# Patient Record
Sex: MALE | Race: WHITE | NOT HISPANIC OR LATINO | Employment: FULL TIME | URBAN - METROPOLITAN AREA
[De-identification: names, ages, dates, MRNs, and addresses within clinical notes are randomized per-mention and may not be internally consistent; named-entity substitution may affect disease eponyms.]

---

## 2019-01-02 ENCOUNTER — OFFICE VISIT (OUTPATIENT)
Dept: URGENT CARE | Facility: CLINIC | Age: 49
End: 2019-01-02
Payer: COMMERCIAL

## 2019-01-02 VITALS
SYSTOLIC BLOOD PRESSURE: 133 MMHG | HEART RATE: 76 BPM | HEIGHT: 72 IN | WEIGHT: 273 LBS | BODY MASS INDEX: 36.98 KG/M2 | DIASTOLIC BLOOD PRESSURE: 87 MMHG | OXYGEN SATURATION: 99 % | RESPIRATION RATE: 16 BRPM | TEMPERATURE: 98 F
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
## 2019-01-02 DIAGNOSIS — M25.511 ACUTE PAIN OF RIGHT SHOULDER: Primary | ICD-10-CM

## 2019-01-02 LAB
CTP QC/QA: YES
POC 10 PANEL DRUG SCREEN: NEGATIVE

## 2019-01-02 PROCEDURE — 99204 PR OFFICE/OUTPT VISIT, NEW, LEVL IV, 45-59 MIN: ICD-10-PCS | Mod: S$GLB,,, | Performed by: INTERNAL MEDICINE

## 2019-01-02 PROCEDURE — 73030 X-RAY EXAM OF SHOULDER: CPT | Mod: RT,S$GLB,, | Performed by: RADIOLOGY

## 2019-01-02 PROCEDURE — 73030 XR SHOULDER TRAUMA 3 VIEW RIGHT: ICD-10-PCS | Mod: RT,S$GLB,, | Performed by: RADIOLOGY

## 2019-01-02 PROCEDURE — 99204 OFFICE O/P NEW MOD 45 MIN: CPT | Mod: S$GLB,,, | Performed by: INTERNAL MEDICINE

## 2019-01-02 PROCEDURE — 80305 DRUG TEST PRSMV DIR OPT OBS: CPT | Mod: QW,S$GLB,, | Performed by: INTERNAL MEDICINE

## 2019-01-02 PROCEDURE — 80305 POCT RAPID DRUG SCREEN 10 PANEL: ICD-10-PCS | Mod: QW,S$GLB,, | Performed by: INTERNAL MEDICINE

## 2019-01-02 RX ORDER — NAPROXEN 500 MG/1
500 TABLET ORAL
Qty: 21 TABLET | Refills: 2 | Status: SHIPPED | OUTPATIENT
Start: 2019-01-02 | End: 2019-01-09

## 2019-01-02 NOTE — LETTER
[facility]  [address] [place] [address], [address]  [facility] [address]  Phone: [phone]  Fax: [phone]  [facility]: [email or web address]    Pt Name: Arsenio Espinoza III  Injury Date: 01/02/2019   Employee ID:  Date of First Treatment: 01/02/2019   Company: [employer].       Appointment Time: 06:15 PM Arrived: 6:30pm   Provider: Sera Hill MD Time Out:7:30pm     Office Treatment:   1. Acute pain of right shoulder    2. Accident at workplace      Medications Ordered This Encounter   Medications    naproxen (NAPROSYN) 500 MG tablet                 Return Appointment: 1-16-19 [facility]  [address] [place] [address]  [place] [address]

## 2019-01-03 ENCOUNTER — OFFICE VISIT (OUTPATIENT)
Dept: URGENT CARE | Facility: CLINIC | Age: 49
End: 2019-01-03
Payer: COMMERCIAL

## 2019-01-03 VITALS
DIASTOLIC BLOOD PRESSURE: 83 MMHG | WEIGHT: 273 LBS | HEIGHT: 73 IN | BODY MASS INDEX: 36.18 KG/M2 | TEMPERATURE: 97 F | SYSTOLIC BLOOD PRESSURE: 123 MMHG | RESPIRATION RATE: 18 BRPM | OXYGEN SATURATION: 98 % | HEART RATE: 66 BPM

## 2019-01-03 DIAGNOSIS — S29.011A STRAIN OF RIGHT PECTORALIS MUSCLE, INITIAL ENCOUNTER: Primary | ICD-10-CM

## 2019-01-03 PROCEDURE — 99204 OFFICE O/P NEW MOD 45 MIN: CPT | Mod: S$GLB,,, | Performed by: FAMILY MEDICINE

## 2019-01-03 PROCEDURE — 99204 PR OFFICE/OUTPT VISIT, NEW, LEVL IV, 45-59 MIN: ICD-10-PCS | Mod: S$GLB,,, | Performed by: FAMILY MEDICINE

## 2019-01-03 NOTE — PATIENT INSTRUCTIONS
Shoulder Sprain  A sprain is a stretching or tearing of the ligaments that hold a joint together. A sprain may take up to 8 weeks to fully heal, depending on how severe it is. Moderate to severe shoulder sprains are treated with a sling or shoulder immobilizer. Minor sprains can be treated without any special support.  Home care  The following guidelines will help you care for your injury at home:  · If a sling was given to you, leave it in place for the time advised by your healthcare provider. If you arent sure how long to wear it, ask for advice. If the sling becomes loose, adjust it so that your forearm is level with the ground. Your shoulder should feel well supported.  · Put an ice pack on the injured area for 20 minutes every 1 to 2 hours the first day. You can make your own ice pack by putting ice cubes in a plastic bag. A bag of frozen peas or something similar works well too. Wrap the bag in a thin towel. Continue with ice packs 3 to 4 times a day for the next 2 to 3 days. Then use the pack as needed to ease pain and swelling.  · You may use acetaminophen or ibuprofen to control pain, unless another pain medicine was prescribed. If you have chronic liver or kidney disease, talk with your healthcare provider before using these medicines. Also talk with your provider if youve had a stomach ulcer or gastrointestinal bleeding.  · Shoulder joints become stiff if left in a sling for too long. You should start range of motion exercises about 7 to 10 days after the injury. Talk with your provider to find out what type of exercises to do and how soon to start.  Follow-up care  Follow up with your healthcare provider, or as advised.  Any X-rays you had today dont show any broken bones, breaks, or fractures. Sometimes fractures dont show up on the first X-ray. Bruises and sprains can sometimes hurt as much as a fracture. These injuries can take time to heal completely. If your symptoms dont improve or they get  worse, talk with your provider. You may need a repeat X-ray or other treatments.  When to seek medical advice  Call your healthcare provider right away if any of these occur:  · Shoulder pain or swelling in your arm that gets worse  · Fingers become cold, blue, numb, or tingly  · Large amount of bruising of the shoulder or upper arm  · Fever or chills  Date Last Reviewed: 8/1/2016  © 8094-5705 [employer]. [address] [place] [address]. All rights reserved. This information is not intended as a substitute for professional medical care. Always follow your healthcare professional's instructions.        Doorway Pectoral Stretch (Flexibility)    1.  an open doorway. Raise each arm up to the side, bent at 90-degree angles with palms forward. Rest your palms on the door frame.  2. Slowly step forward with one foot. Feel the stretch in your shoulders and chest. Stand upright and dont lean forward.  3. Hold for 30 seconds. Step back and relax.  4. Repeat 3 times, or as instructed.  Date Last Reviewed: 3/10/2016  © 1219-2146 [employer]. [address] [place] [address]. All rights reserved. This information is not intended as a substitute for professional medical care. Always follow your healthcare professional's instructions.

## 2019-01-03 NOTE — PROGRESS NOTES
Subjective:       Patient ID: Arsenio Espinoza III is a 48 y.o. male.    Vitals:  height is 6' (1.829 m) and weight is 123.8 kg (273 lb). His temperature is 98.3 °F (36.8 °C). His blood pressure is 133/87 and his pulse is 76. His respiration is 16 and oxygen saturation is 99%.     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 Pt states his arm is 3/10 pain level resting, but 9/10 when he tries to lift it.                Shoulder Pain    The pain is present in the right shoulder. This is a new problem. The current episode started today. The problem has been unchanged. The pain is at a severity of 3/10. Pertinent negatives include no limited range of motion.       Constitution: Negative for fatigue.   HENT: Negative for facial swelling and facial trauma.    Neck: Negative for neck stiffness.   Cardiovascular: Negative for chest trauma.   Eyes: Negative for eye trauma, double vision and blurred vision.   Gastrointestinal: Negative for abdominal trauma, abdominal pain and rectal bleeding.   Genitourinary: Negative for hematuria, genital trauma and pelvic pain.   Musculoskeletal: Positive for pain and trauma. Negative for joint swelling, abnormal ROM of joint and pain with walking.   Skin: Negative for color change, wound, abrasion and laceration.   Neurological: Negative for dizziness, history of vertigo, light-headedness, coordination disturbances, altered mental status and loss of consciousness.   Hematologic/Lymphatic: Negative for history of bleeding disorder.   Psychiatric/Behavioral: Negative for altered mental status.       Objective:      Physical Exam   Constitutional: He is oriented to person, place, and time. He appears well-developed and well-nourished. He is cooperative.  Non-toxic appearance. He does not appear ill. No distress.   HENT:   Head: Normocephalic and atraumatic.   Right Ear: Hearing,  tympanic membrane, external ear and ear canal normal.   Left Ear: Hearing, tympanic membrane, external ear and ear canal normal.   Nose: Nose normal. No mucosal edema, rhinorrhea or nasal deformity. No epistaxis. Right sinus exhibits no maxillary sinus tenderness and no frontal sinus tenderness. Left sinus exhibits no maxillary sinus tenderness and no frontal sinus tenderness.   Mouth/Throat: Uvula is midline, oropharynx is clear and moist and mucous membranes are normal. No trismus in the jaw. Normal dentition. No uvula swelling. No posterior oropharyngeal erythema.   Eyes: Conjunctivae and lids are normal. Right eye exhibits no discharge. Left eye exhibits no discharge. No scleral icterus.   Sclera clear bilat   Neck: Trachea normal, normal range of motion, full passive range of motion without pain and phonation normal. Neck supple.   Cardiovascular: Normal rate, regular rhythm, normal heart sounds, intact distal pulses and normal pulses.   Pulmonary/Chest: Effort normal and breath sounds normal. No respiratory distress.   Abdominal: Soft. Normal appearance and bowel sounds are normal. He exhibits no distension, no pulsatile midline mass and no mass. There is no tenderness.   Musculoskeletal: He exhibits tenderness (right shoulder area and anterior chest.). He exhibits no edema or deformity.        Right shoulder: He exhibits decreased range of motion, tenderness (right anterior), pain and spasm. He exhibits no bony tenderness, no swelling, no effusion, no crepitus, no deformity and no laceration.        Arms:  Neurological: He is alert and oriented to person, place, and time. He exhibits normal muscle tone. Coordination normal.   Skin: Skin is warm, dry and intact. He is not diaphoretic. No pallor.   Psychiatric: He has a normal mood and affect. His speech is normal and behavior is normal. Judgment and thought content normal. Cognition and memory are normal.   Nursing note and vitals reviewed.      Assessment:        1. Accident at workplace    2. Acute pain of right shoulder        Plan:       2  Accident at workplace  -     POCT Rapid Drug Screen 10 Panel  -     XR SHOULDER TRAUMA 3 VIEW RIGHT; Future; Expected date: 01/02/2019  -     Cancel: XR SHOULDER COMPLETE 2 OR MORE VIEWS RIGHT; Future; Expected date: 01/02/2019    Acute pain of right shoulder  -     Cancel: XR SHOULDER COMPLETE 2 OR MORE VIEWS RIGHT; Future; Expected date: 01/02/2019  -     naproxen (NAPROSYN) 500 MG tablet; Take 1 tablet (500 mg total) by mouth after meals as needed.  Dispense: 21 tablet; Refill: 2    If your condition worsens we recommend that you receive another evaluation at the emergency room immediately or contact your primary medical clinics after hours call service to discuss your concerns. You must understand that you've received an Urgent Care treatment only and that you may be released before all of your medical problems are known or treated. You, the patient, will arrange for follow up care as instructed.  Drink plenty of Fluids  Wash hands frequently using mild antibacterial soap lathering for at least 15 seconds then rinse  Get plenty of Rest  Rest, Heat and elevate the affected area at a level above the heart.   Follow up in Occ med clinic tomorrow  If you are not allergic please take Tylenol every 4-6 hours as needed and/or Ibuprofen every 6-8 hours as needed, over the counter for pain or fever.

## 2019-01-03 NOTE — PROGRESS NOTES
"Subjective:       Patient ID: Arsenio Espinoza III is a 48 y.o. male.    Vitals:  height is 6' 1" (1.854 m) and weight is 123.8 kg (273 lb). His oral temperature is 97 °F (36.1 °C). His blood pressure is 123/83 and his pulse is 66. His respiration is 18 and oxygen saturation is 98%.     Chief Complaint: Chest Injury    Pt states seen in [facility] for injury on 1/2/19. States the shoulder is not the issues, his having right chest wall pains      Shoulder Injury    The incident occurred at work. The right shoulder is affected. The incident occurred 12 to 24 hours ago. The injury mechanism was a direct blow. The quality of the pain is described as aching. The pain is mild. Associated symptoms include chest pain. The symptoms are aggravated by movement, overhead lifting and palpation. He has tried nothing for the symptoms. The treatment provided no relief.    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  He had to catch hold of a part of the forklift to keep from falling in the mud and that is how the hyperextension occurred.  He believes he may have bumped his shin but states it is only minimally sore in the left side and he is not having any difficulty walking.  He is continuing to have discomfort and difficulty abducting or externally rotating his shoulder without significant pain. He denies any cystic sting symptoms such is pleuritic chest pain fever chills difficulty breathing or swallowing.    Constitution: Negative for chills, fatigue and fever.   HENT: Negative for congestion and sore throat.    Neck: Negative for painful lymph nodes.   Cardiovascular: Positive for chest pain. Negative for leg swelling.   Eyes: Negative for double vision and blurred vision.   Respiratory: Negative for cough and shortness of breath.    Gastrointestinal: Negative for " nausea, vomiting and diarrhea.   Genitourinary: Negative for dysuria, frequency and urgency.   Musculoskeletal: Negative for joint pain, joint swelling, muscle cramps and muscle ache.   Skin: Negative for color change, pale and rash.   Allergic/Immunologic: Negative for seasonal allergies.   Neurological: Negative for dizziness, history of vertigo, light-headedness, passing out and headaches.   Hematologic/Lymphatic: Negative for swollen lymph nodes, easy bruising/bleeding and history of blood clots. Does not bruise/bleed easily.   Psychiatric/Behavioral: Negative for nervous/anxious, sleep disturbance and depression. The patient is not nervous/anxious.        Objective:      Physical Exam   Constitutional: He is oriented to person, place, and time. He appears well-developed and well-nourished. He is cooperative.  Non-toxic appearance. He does not appear ill. No distress.   HENT:   Head: Normocephalic and atraumatic.   Right Ear: Hearing, tympanic membrane, external ear and ear canal normal.   Left Ear: Hearing, tympanic membrane, external ear and ear canal normal.   Nose: Nose normal. No mucosal edema, rhinorrhea or nasal deformity. No epistaxis. Right sinus exhibits no maxillary sinus tenderness and no frontal sinus tenderness. Left sinus exhibits no maxillary sinus tenderness and no frontal sinus tenderness.   Mouth/Throat: Uvula is midline, oropharynx is clear and moist and mucous membranes are normal. No trismus in the jaw. Normal dentition. No uvula swelling. No posterior oropharyngeal erythema.   Eyes: Conjunctivae and lids are normal. Right eye exhibits no discharge. Left eye exhibits no discharge. No scleral icterus.   Sclera clear bilat   Neck: Trachea normal, normal range of motion, full passive range of motion without pain and phonation normal. Neck supple.   Cardiovascular: Normal rate, regular rhythm, normal heart sounds, intact distal pulses and normal pulses.   Pulmonary/Chest: Effort normal and  breath sounds normal. No respiratory distress.   Abdominal: Soft. Normal appearance and bowel sounds are normal. He exhibits no distension, no pulsatile midline mass and no mass. There is no tenderness.   Musculoskeletal: Normal range of motion. He exhibits no edema or deformity.   Tenderness in the right anterior rotator cuff and along the pectoralis major tendons.  Limited active range of motion of the right shoulder to 25° of abduction 10° of external rotation and about 30° of abduction.  Normal range of motion in a dependent position no distal right elbow hand pain or limited range of motion.  Distal neurovascular intact.   Neurological: He is alert and oriented to person, place, and time. He exhibits normal muscle tone. Coordination normal.   Skin: Skin is warm, dry and intact. He is not diaphoretic. No pallor.   Psychiatric: He has a normal mood and affect. His speech is normal and behavior is normal. Judgment and thought content normal. Cognition and memory are normal.   Nursing note and vitals reviewed.      Assessment:       1. Strain of right pectoralis muscle, initial encounter        Plan:         Strain of right pectoralis muscle, initial encounter      I have reviewed the patient's right shoulder x-rays done yesterday they show no evidence of subluxation dislocation or fracture.  At this point I think the patient can return to sedentary or light duty work without use of the right arm if such is available to him he is to continue the naproxen prescribed yesterday and he I have shown him some range-of-motion exercises to begin using his right shoulder.  He will follow up in 5 days for re-evaluation and we will assess is range of motion at that time.

## 2019-01-03 NOTE — PATIENT INSTRUCTIONS
If your condition worsens we recommend that you receive another evaluation at the emergency room immediately or contact your primary medical clinics after hours call service to discuss your concerns. You must understand that you've received an Urgent Care treatment only and that you may be released before all of your medical problems are known or treated. You, the patient, will arrange for follow up care as instructed.  Drink plenty of Fluids  Wash hands frequently using mild antibacterial soap lathering for at least 15 seconds then rinse  Get plenty of Rest  Rest, Heat and elevate the affected area at a level above the heart.   Follow up in [facility] clinic tomorrow  If you are not allergic please take Tylenol every 4-6 hours as needed and/or Ibuprofen every 6-8 hours as needed, over the counter for pain or fever.      Shoulder Sprain  A sprain is a stretching or tearing of the ligaments that hold a joint together. A sprain may take up to 8 weeks to fully heal, depending on how severe it is. Moderate to severe shoulder sprains are treated with a sling or shoulder immobilizer. Minor sprains can be treated without any special support.  Home care  The following guidelines will help you care for your injury at home:  · If a sling was given to you, leave it in place for the time advised by your healthcare provider. If you arent sure how long to wear it, ask for advice. If the sling becomes loose, adjust it so that your forearm is level with the ground. Your shoulder should feel well supported.  · Put an ice pack on the injured area for 20 minutes every 1 to 2 hours the first day. You can make your own ice pack by putting ice cubes in a plastic bag. A bag of frozen peas or something similar works well too. Wrap the bag in a thin towel. Continue with ice packs 3 to 4 times a day for the next 2 to 3 days. Then use the pack as needed to ease pain and swelling.  · You may use acetaminophen or ibuprofen to control pain, unless  another pain medicine was prescribed. If you have chronic liver or kidney disease, talk with your healthcare provider before using these medicines. Also talk with your provider if youve had a stomach ulcer or gastrointestinal bleeding.  · Shoulder joints become stiff if left in a sling for too long. You should start range of motion exercises about 7 to 10 days after the injury. Talk with your provider to find out what type of exercises to do and how soon to start.  Follow-up care  Follow up with your healthcare provider, or as advised.  Any X-rays you had today dont show any broken bones, breaks, or fractures. Sometimes fractures dont show up on the first X-ray. Bruises and sprains can sometimes hurt as much as a fracture. These injuries can take time to heal completely. If your symptoms dont improve or they get worse, talk with your provider. You may need a repeat X-ray or other treatments.  When to seek medical advice  Call your healthcare provider right away if any of these occur:  · Shoulder pain or swelling in your arm that gets worse  · Fingers become cold, blue, numb, or tingly  · Large amount of bruising of the shoulder or upper arm  · Fever or chills  Date Last Reviewed: 8/1/2016  © 0111-0751 [employer]. [address] [place] [address], [place] [address]. All rights reserved. This information is not intended as a substitute for professional medical care. Always follow your healthcare professional's instructions.

## 2019-01-03 NOTE — LETTER
[facility] [address], [address]  [facility] [address]  Phone: [phone]  Fax: [phone]  [facility]: [id number]    Pt Name: Arsenio Espinoza III  Injury Date: 01/02/2019   Employee ID:  Date of Treatment: 01/03/2019   Company: [employer]      Appointment Time: 08:25 AM Arrived: 8:40 am   Provider: Dr. Homar Arvizu Time Out:9:24 am     Office Treatment:   1. Strain of right pectoralis muscle, initial encounter               Restrictions: No lifting/pushing/pulling more than 10 lbs, Limited use of right hand and arm(Released for light duty with minimal use of right arm.  No overhead work w/ right arm. )     Return Appointment: 1/07/2019

## 2019-01-07 ENCOUNTER — OFFICE VISIT (OUTPATIENT)
Dept: URGENT CARE | Facility: CLINIC | Age: 49
End: 2019-01-07
Payer: COMMERCIAL

## 2019-01-07 VITALS
HEIGHT: 73 IN | RESPIRATION RATE: 15 BRPM | TEMPERATURE: 98 F | BODY MASS INDEX: 36.18 KG/M2 | WEIGHT: 273 LBS | SYSTOLIC BLOOD PRESSURE: 137 MMHG | DIASTOLIC BLOOD PRESSURE: 84 MMHG | HEART RATE: 82 BPM | OXYGEN SATURATION: 98 %

## 2019-01-07 DIAGNOSIS — S29.011D PECTORALIS MUSCLE STRAIN, SUBSEQUENT ENCOUNTER: Primary | ICD-10-CM

## 2019-01-07 PROCEDURE — 99213 OFFICE O/P EST LOW 20 MIN: CPT | Mod: S$GLB,,, | Performed by: FAMILY MEDICINE

## 2019-01-07 PROCEDURE — 99213 PR OFFICE/OUTPT VISIT, EST, LEVL III, 20-29 MIN: ICD-10-PCS | Mod: S$GLB,,, | Performed by: FAMILY MEDICINE

## 2019-01-07 RX ORDER — ARMODAFINIL 200 MG/1
200 TABLET ORAL
COMMUNITY
Start: 2018-05-17 | End: 2019-01-25 | Stop reason: SDUPTHER

## 2019-01-07 NOTE — PROGRESS NOTES
Subjective:       Patient ID: Arsenio Espinoza III is a 48 y.o. male.    Chief Complaint: Follow-up (pt was injured last Thursday at work)    Pt said that his pain is significantly improved. He still has some pain when he raises his right arm high and he has not attempted to do any heavy lifting.      Pain   This is a new problem. The current episode started in the past 7 days. The problem occurs constantly. The problem has been gradually improving. Associated symptoms include myalgias. Pertinent negatives include no abdominal pain, chest pain, neck pain, numbness or weakness. The symptoms are aggravated by exertion. He has tried heat, rest and NSAIDs for the symptoms. The treatment provided moderate relief.    Patient's range of motion is returned normal over the last days.  He still has a twinge of pain at the insertion of his pectoralis tendon on his anterior glenoid region indeed find mild sore areas over the distal muscle itself if he palpates however his range of motion is full and he believes home with care he can return to his full duty.  He is not having any new symptoms and specifically denies any denies any numbness tingling burning or other symptoms suggestive of circulatory or neurologic compromise.  Review of Systems   Constitution: Negative for weakness and malaise/fatigue.   HENT: Negative for nosebleeds.    Cardiovascular: Negative for chest pain and syncope.   Respiratory: Negative for shortness of breath.    Musculoskeletal: Positive for joint pain and myalgias. Negative for back pain and neck pain.   Gastrointestinal: Negative for abdominal pain.   Genitourinary: Negative for hematuria.   Neurological: Negative for dizziness and numbness.       Objective:      Physical Exam   Constitutional: He appears well-developed and well-nourished.   Cardiovascular: Normal rate and regular rhythm.   Pulmonary/Chest: Effort normal and breath sounds normal.   Musculoskeletal:   Full range of motion both  shoulders in all planes.  Mild tenderness along the right pectoralis insertion and distal muscle belly no ecchymoses no swelling no deformity.  Distal neurovascular in  strength are all normal.       Assessment:       1. Pectoralis muscle strain, subsequent encounter        Plan:       Patient may return to full duty he does not need scheduled follow-up appointment.  He understands that if his pain returns with full duty and is not improving over the next 1-2 weeks than he should follow up and we will consider additional management options.  At this time however patient is stable discharge.         Restrictions: Regular Duty(Return to full duty 1/7/2019)  Follow-up if symptoms worsen or fail to improve.

## 2019-01-07 NOTE — LETTER
[facility] [place] [address], [address]  [facility] [address]  Phone: [phone]  Fax: [phone]  [facility]: [email or web address]    Pt Name: Arsenio Espinoza III  Injury Date: 01/02/2019   Employee ID:  Date of Treatment: 01/07/2019   Company: [employer]      Appointment Time: 08:25 AM Arrived: 8:40 AM   Provider: Homar Arvizu MD Time Out: 9:16 AM     Office Treatment:   1. Pectoralis muscle strain, subsequent encounter               Restrictions: Regular Duty(Return to full duty 1/7/2019)     Return Appointment: N/A

## 2020-08-21 PROBLEM — J01.90 ACUTE SINUSITIS, UNSPECIFIED: Status: RESOLVED | Noted: 2020-04-18 | Resolved: 2020-08-21

## 2020-08-21 PROBLEM — F41.9 ANXIETY: Status: ACTIVE | Noted: 2018-08-21

## 2020-08-21 PROBLEM — M25.511 RIGHT SHOULDER PAIN: Status: RESOLVED | Noted: 2019-01-02 | Resolved: 2020-08-21

## 2020-08-21 PROBLEM — M54.9 DORSALGIA, UNSPECIFIED: Status: ACTIVE | Noted: 2020-04-18

## 2020-08-21 PROBLEM — M54.9 DORSALGIA, UNSPECIFIED: Status: RESOLVED | Noted: 2020-04-18 | Resolved: 2020-08-21

## 2020-08-21 PROBLEM — J01.90 ACUTE SINUSITIS, UNSPECIFIED: Status: ACTIVE | Noted: 2020-04-18

## 2021-10-05 PROBLEM — K85.20 ALCOHOL-INDUCED ACUTE PANCREATITIS: Status: ACTIVE | Noted: 2021-10-05

## 2021-10-05 PROBLEM — M54.9 CHRONIC BACK PAIN: Status: ACTIVE | Noted: 2021-10-05

## 2021-10-05 PROBLEM — G89.29 CHRONIC BACK PAIN: Status: ACTIVE | Noted: 2021-10-05

## 2022-08-12 ENCOUNTER — TELEPHONE (OUTPATIENT)
Dept: HEMATOLOGY/ONCOLOGY | Facility: CLINIC | Age: 52
End: 2022-08-12
Payer: COMMERCIAL

## 2022-08-12 DIAGNOSIS — C43.9 SKIN CANCER (MELANOMA): Primary | ICD-10-CM

## 2022-08-12 NOTE — NURSING
Received referral from [facility] for Melanoma of the shoulder. Appointment info and directions to cancer center provided to patient and he verbalized understanding    Oncology Navigation   Intake  Date of Diagnosis: 8/10/2022  Cancer Type: Melanoma  Internal / External Referral: External  Referral Source: Paper referral from Carey Cedeño  Date of Referral: 2022  Initial Nurse Navigator Contact: 2022  Referral to Initial Contact Timeline (days): 1  Date Worked: 2022  First Appointment Available: 2022  Appointment Date: 2022  First Available Date vs. Scheduled Date (days): 0  Reason if booked > 7 days after scheduling: Specific provider / access     Treatment     Surgical Oncologist: Dr. Tobin Camacho (general surgery)  Consult Date: 2022                          Acuity  ECO  Comorbidities in Medical History: 1  Navigation Acuity: 1     Follow Up  No follow-ups on file.

## 2022-08-18 ENCOUNTER — OFFICE VISIT (OUTPATIENT)
Dept: HEMATOLOGY/ONCOLOGY | Facility: CLINIC | Age: 52
End: 2022-08-18
Payer: COMMERCIAL

## 2022-08-18 VITALS
HEART RATE: 66 BPM | TEMPERATURE: 98 F | RESPIRATION RATE: 16 BRPM | DIASTOLIC BLOOD PRESSURE: 68 MMHG | WEIGHT: 267.44 LBS | HEIGHT: 72 IN | OXYGEN SATURATION: 97 % | BODY MASS INDEX: 36.22 KG/M2 | SYSTOLIC BLOOD PRESSURE: 140 MMHG

## 2022-08-18 DIAGNOSIS — C43.59 MALIGNANT MELANOMA OF UPPER BACK: ICD-10-CM

## 2022-08-18 PROCEDURE — 3078F PR MOST RECENT DIASTOLIC BLOOD PRESSURE < 80 MM HG: ICD-10-PCS | Mod: CPTII,S$GLB,, | Performed by: SURGERY

## 2022-08-18 PROCEDURE — 1160F PR REVIEW ALL MEDS BY PRESCRIBER/CLIN PHARMACIST DOCUMENTED: ICD-10-PCS | Mod: CPTII,S$GLB,, | Performed by: SURGERY

## 2022-08-18 PROCEDURE — 1159F PR MEDICATION LIST DOCUMENTED IN MEDICAL RECORD: ICD-10-PCS | Mod: CPTII,S$GLB,, | Performed by: SURGERY

## 2022-08-18 PROCEDURE — 3077F SYST BP >= 140 MM HG: CPT | Mod: CPTII,S$GLB,, | Performed by: SURGERY

## 2022-08-18 PROCEDURE — 99999 PR PBB SHADOW E&M-EST. PATIENT-LVL V: CPT | Mod: PBBFAC,,, | Performed by: SURGERY

## 2022-08-18 PROCEDURE — 99203 PR OFFICE/OUTPT VISIT, NEW, LEVL III, 30-44 MIN: ICD-10-PCS | Mod: S$GLB,,, | Performed by: SURGERY

## 2022-08-18 PROCEDURE — 99999 PR PBB SHADOW E&M-EST. PATIENT-LVL V: ICD-10-PCS | Mod: PBBFAC,,, | Performed by: SURGERY

## 2022-08-18 PROCEDURE — 3078F DIAST BP <80 MM HG: CPT | Mod: CPTII,S$GLB,, | Performed by: SURGERY

## 2022-08-18 PROCEDURE — 3008F PR BODY MASS INDEX (BMI) DOCUMENTED: ICD-10-PCS | Mod: CPTII,S$GLB,, | Performed by: SURGERY

## 2022-08-18 PROCEDURE — 3077F PR MOST RECENT SYSTOLIC BLOOD PRESSURE >= 140 MM HG: ICD-10-PCS | Mod: CPTII,S$GLB,, | Performed by: SURGERY

## 2022-08-18 PROCEDURE — 1159F MED LIST DOCD IN RCRD: CPT | Mod: CPTII,S$GLB,, | Performed by: SURGERY

## 2022-08-18 PROCEDURE — 99203 OFFICE O/P NEW LOW 30 MIN: CPT | Mod: S$GLB,,, | Performed by: SURGERY

## 2022-08-18 PROCEDURE — 3008F BODY MASS INDEX DOCD: CPT | Mod: CPTII,S$GLB,, | Performed by: SURGERY

## 2022-08-18 PROCEDURE — 1160F RVW MEDS BY RX/DR IN RCRD: CPT | Mod: CPTII,S$GLB,, | Performed by: SURGERY

## 2022-08-18 RX ORDER — SODIUM CHLORIDE, SODIUM LACTATE, POTASSIUM CHLORIDE, CALCIUM CHLORIDE 600; 310; 30; 20 MG/100ML; MG/100ML; MG/100ML; MG/100ML
INJECTION, SOLUTION INTRAVENOUS CONTINUOUS
Status: CANCELLED | OUTPATIENT
Start: 2022-08-18

## 2022-08-18 RX ORDER — CLINDAMYCIN PHOSPHATE 900 MG/50ML
900 INJECTION, SOLUTION INTRAVENOUS
Status: CANCELLED | OUTPATIENT
Start: 2022-08-18

## 2022-08-18 RX ORDER — LIDOCAINE HYDROCHLORIDE 10 MG/ML
1 INJECTION, SOLUTION EPIDURAL; INFILTRATION; INTRACAUDAL; PERINEURAL ONCE
Status: CANCELLED | OUTPATIENT
Start: 2022-08-18 | End: 2022-08-18

## 2022-08-18 NOTE — PROGRESS NOTES
SUBJECTIVE:     History of Present Illness:  Very pleasant 51-year-old male referred by Dr. Cedeño for newly diagnosed right upper back malignant melanoma.  Patient reports pigmented lesion had been located in this area for several years.  His wife subsequently noticed that the lesion had increased in size and was also darkening.  Patient denies bleeding or any significant discomfort in this area.  Patient underwent full body skin examination with subsequent punch biopsy performed on August 4th.  Pathology consistent with 0.5 mm superficial spreading malignant melanoma.  No evidence of ulceration.  No dermal mitoses noted.  No lymphovascular invasion.      Patient denies personal or family history of skin malignancy.  He does report excessive sun exposure throughout his youth.    Chief Complaint   Patient presents with    Melanoma     Melanoma right shoulder/Carey Cedeño (dermatologist)       Review of patient's allergies indicates:   Allergen Reactions    Penicillins Nausea And Vomiting     Other reaction(s): vomiting as child       Current Outpatient Medications   Medication Sig Dispense Refill    cholecalciferol, vitamin D3, (VITAMIN D3) 50 mcg (2,000 unit) Tab Take 1 tablet (2,000 Units total) by mouth once daily.      DULoxetine (CYMBALTA) 30 MG capsule TAKE 1 CAPSULE (30 MG TOTAL) BY MOUTH ONCE DAILY. 30 capsule 11    fluconazole (DIFLUCAN) 200 MG Tab Take 200 mg by mouth every 7 days.      fluticasone propionate (FLONASE) 50 mcg/actuation nasal spray 1 spray (50 mcg total) by Each Nostril route once daily. 16 g 1    levothyroxine (SYNTHROID) 88 MCG tablet Take 1 tablet (88 mcg total) by mouth before breakfast. 90 tablet 1    podofilox (CONDYLOX) 0.5 % external solution Apply topically.      armodafiniL 200 mg Tab TAKE 1 TABLET BY MOUTH ONCE DAILY. (Patient not taking: Reported on 8/18/2022) 30 tablet 0    CIALIS 5 mg tablet Take 1 tablet (5 mg total) by mouth daily as needed. (Patient not taking:  Reported on 8/18/2022) 90 tablet 3    levocetirizine (XYZAL) 5 MG tablet Take 1 tablet (5 mg total) by mouth every evening. (Patient not taking: Reported on 8/18/2022) 30 tablet 1    vardenafiL (LEVITRA) 20 MG tablet Take 20 mg by mouth daily as needed.       No current facility-administered medications for this visit.       Past Medical History:   Diagnosis Date    Ankle sprain     Anxiety disorder     Arthralgia     Back pain     Circadian rhythm sleep disorder, shift work type     History of chicken pox     History of exposure to hazardous bodily fluids     Hypothyroid     4/06/21 RXd Levothyroxine 88 mcg daily; ordered follow up TSH/T4/T3 for 8/2021    Onychomycosis     Periorbital cellulitis     Sleep apnea     Stye      History reviewed. No pertinent surgical history.  Family History   Problem Relation Age of Onset    Diabetes Maternal Grandfather     Heart disease Maternal Grandfather     Heart attack Maternal Grandfather     Emphysema Mother     Asthma Mother     Heart attack Maternal Grandmother      Social History     Tobacco Use    Smoking status: Never Smoker    Smokeless tobacco: Never Used   Substance Use Topics    Alcohol use: Yes     Comment: occasionally    Drug use: No        Review of Systems:  Review of Systems   Constitutional: Negative for appetite change, chills and fever.   Respiratory: Negative for cough, chest tightness and shortness of breath.    Cardiovascular: Negative for chest pain and palpitations.   Gastrointestinal: Negative for abdominal pain.   Hematological: Negative for adenopathy. Does not bruise/bleed easily.       OBJECTIVE:     Vital Signs (Most Recent)  Temp: 97.7 °F (36.5 °C) (08/18/22 1306)  Pulse: 66 (08/18/22 1306)  Resp: 16 (08/18/22 1306)  BP: (!) 140/68 (08/18/22 1312)  SpO2: 97 % (08/18/22 1306)  6' (1.829 m)  121.3 kg (267 lb 6.7 oz)     Physical Exam:  Physical Exam  Constitutional:       General: He is not in acute distress.     Comments:  Fair complexion     Cardiovascular:      Rate and Rhythm: Normal rate.      Pulses: Normal pulses.   Pulmonary:      Effort: Pulmonary effort is normal. No respiratory distress.      Breath sounds: No wheezing.   Musculoskeletal:      Cervical back: Normal range of motion and neck supple.   Skin:     Comments: See photo   Neurological:      Mental Status: He is alert.               Diagnostic Results:  none    Assessment:       1. Malignant melanoma of upper back        Plan:       Thorough conversation with the patient today regarding new diagnosis of malignant melanoma.  Surgical recommendations for wide local excision based on NCCN guidelines have been reviewed at length.  Rationale for 1 cm margins with anticipated primary closure reviewed at length.  Anticipated recovery process also discussed.  Risks, benefits, alternatives for surgery reviewed at length.  Patient has verbalized his understanding wishes to proceed.

## 2022-08-26 ENCOUNTER — TELEPHONE (OUTPATIENT)
Dept: HEMATOLOGY/ONCOLOGY | Facility: CLINIC | Age: 52
End: 2022-08-26
Payer: COMMERCIAL

## 2022-08-26 NOTE — NURSING
Referral received for patient to see Dr. Carrasquillo. Appt info and directions to cancer center provided and patient verbalized understanding

## 2022-08-29 DIAGNOSIS — C00.9 LIP CANCER: Primary | ICD-10-CM

## 2022-08-31 ENCOUNTER — TELEPHONE (OUTPATIENT)
Dept: HEMATOLOGY/ONCOLOGY | Facility: CLINIC | Age: 52
End: 2022-08-31
Payer: COMMERCIAL

## 2022-09-01 DIAGNOSIS — C00.9 LIP CANCER: Primary | ICD-10-CM

## 2022-09-01 PROBLEM — C43.59 MALIGNANT MELANOMA OF UPPER BACK: Status: ACTIVE | Noted: 2022-09-01

## 2022-09-07 ENCOUNTER — PATIENT MESSAGE (OUTPATIENT)
Dept: HEMATOLOGY/ONCOLOGY | Facility: CLINIC | Age: 52
End: 2022-09-07
Payer: COMMERCIAL

## 2022-09-08 ENCOUNTER — TELEPHONE (OUTPATIENT)
Dept: HEMATOLOGY/ONCOLOGY | Facility: CLINIC | Age: 52
End: 2022-09-08
Payer: COMMERCIAL

## 2022-09-08 ENCOUNTER — PATIENT MESSAGE (OUTPATIENT)
Dept: HEMATOLOGY/ONCOLOGY | Facility: CLINIC | Age: 52
End: 2022-09-08
Payer: COMMERCIAL

## 2022-09-08 NOTE — NURSING
Rescheduled patient's appointment to see Dr. Carrasquillo and Dr. Camacho on 9/15/22. Left patient a msg on VM and also sent portal msg to inform. Awaiting on return call to navigation office

## 2022-09-15 ENCOUNTER — OFFICE VISIT (OUTPATIENT)
Dept: HEMATOLOGY/ONCOLOGY | Facility: CLINIC | Age: 52
End: 2022-09-15
Payer: COMMERCIAL

## 2022-09-15 ENCOUNTER — TELEPHONE (OUTPATIENT)
Dept: HEMATOLOGY/ONCOLOGY | Facility: CLINIC | Age: 52
End: 2022-09-15
Payer: COMMERCIAL

## 2022-09-15 ENCOUNTER — TELEPHONE (OUTPATIENT)
Dept: DERMATOLOGY | Facility: CLINIC | Age: 52
End: 2022-09-15
Payer: COMMERCIAL

## 2022-09-15 VITALS
TEMPERATURE: 98 F | HEART RATE: 96 BPM | OXYGEN SATURATION: 97 % | WEIGHT: 272.94 LBS | RESPIRATION RATE: 18 BRPM | DIASTOLIC BLOOD PRESSURE: 89 MMHG | BODY MASS INDEX: 36.97 KG/M2 | SYSTOLIC BLOOD PRESSURE: 129 MMHG | HEIGHT: 72 IN

## 2022-09-15 VITALS
HEIGHT: 72 IN | OXYGEN SATURATION: 97 % | TEMPERATURE: 98 F | DIASTOLIC BLOOD PRESSURE: 89 MMHG | BODY MASS INDEX: 36.97 KG/M2 | HEART RATE: 96 BPM | RESPIRATION RATE: 18 BRPM | WEIGHT: 272.94 LBS | SYSTOLIC BLOOD PRESSURE: 129 MMHG

## 2022-09-15 DIAGNOSIS — C43.59 MALIGNANT MELANOMA OF UPPER BACK: Primary | ICD-10-CM

## 2022-09-15 DIAGNOSIS — C00.9 LIP CANCER: ICD-10-CM

## 2022-09-15 DIAGNOSIS — D00.01: Primary | ICD-10-CM

## 2022-09-15 PROCEDURE — 3074F PR MOST RECENT SYSTOLIC BLOOD PRESSURE < 130 MM HG: ICD-10-PCS | Mod: CPTII,S$GLB,, | Performed by: SURGERY

## 2022-09-15 PROCEDURE — 1159F MED LIST DOCD IN RCRD: CPT | Mod: CPTII,S$GLB,, | Performed by: SURGERY

## 2022-09-15 PROCEDURE — 99213 OFFICE O/P EST LOW 20 MIN: CPT | Mod: S$GLB,,, | Performed by: SURGERY

## 2022-09-15 PROCEDURE — 99203 PR OFFICE/OUTPT VISIT, NEW, LEVL III, 30-44 MIN: ICD-10-PCS | Mod: S$GLB,,, | Performed by: OTOLARYNGOLOGY

## 2022-09-15 PROCEDURE — 3044F PR MOST RECENT HEMOGLOBIN A1C LEVEL <7.0%: ICD-10-PCS | Mod: CPTII,S$GLB,, | Performed by: SURGERY

## 2022-09-15 PROCEDURE — 3074F SYST BP LT 130 MM HG: CPT | Mod: CPTII,S$GLB,, | Performed by: SURGERY

## 2022-09-15 PROCEDURE — 1160F PR REVIEW ALL MEDS BY PRESCRIBER/CLIN PHARMACIST DOCUMENTED: ICD-10-PCS | Mod: CPTII,S$GLB,, | Performed by: SURGERY

## 2022-09-15 PROCEDURE — 99203 OFFICE O/P NEW LOW 30 MIN: CPT | Mod: S$GLB,,, | Performed by: OTOLARYNGOLOGY

## 2022-09-15 PROCEDURE — 3079F DIAST BP 80-89 MM HG: CPT | Mod: CPTII,S$GLB,, | Performed by: SURGERY

## 2022-09-15 PROCEDURE — 3008F PR BODY MASS INDEX (BMI) DOCUMENTED: ICD-10-PCS | Mod: CPTII,S$GLB,, | Performed by: SURGERY

## 2022-09-15 PROCEDURE — 3074F PR MOST RECENT SYSTOLIC BLOOD PRESSURE < 130 MM HG: ICD-10-PCS | Mod: CPTII,S$GLB,, | Performed by: OTOLARYNGOLOGY

## 2022-09-15 PROCEDURE — 3079F PR MOST RECENT DIASTOLIC BLOOD PRESSURE 80-89 MM HG: ICD-10-PCS | Mod: CPTII,S$GLB,, | Performed by: OTOLARYNGOLOGY

## 2022-09-15 PROCEDURE — 3079F PR MOST RECENT DIASTOLIC BLOOD PRESSURE 80-89 MM HG: ICD-10-PCS | Mod: CPTII,S$GLB,, | Performed by: SURGERY

## 2022-09-15 PROCEDURE — 3074F SYST BP LT 130 MM HG: CPT | Mod: CPTII,S$GLB,, | Performed by: OTOLARYNGOLOGY

## 2022-09-15 PROCEDURE — 3044F HG A1C LEVEL LT 7.0%: CPT | Mod: CPTII,S$GLB,, | Performed by: SURGERY

## 2022-09-15 PROCEDURE — 3008F BODY MASS INDEX DOCD: CPT | Mod: CPTII,S$GLB,, | Performed by: SURGERY

## 2022-09-15 PROCEDURE — 3008F PR BODY MASS INDEX (BMI) DOCUMENTED: ICD-10-PCS | Mod: CPTII,S$GLB,, | Performed by: OTOLARYNGOLOGY

## 2022-09-15 PROCEDURE — 1159F PR MEDICATION LIST DOCUMENTED IN MEDICAL RECORD: ICD-10-PCS | Mod: CPTII,S$GLB,, | Performed by: SURGERY

## 2022-09-15 PROCEDURE — 99999 PR PBB SHADOW E&M-EST. PATIENT-LVL III: CPT | Mod: PBBFAC,,, | Performed by: SURGERY

## 2022-09-15 PROCEDURE — 3008F BODY MASS INDEX DOCD: CPT | Mod: CPTII,S$GLB,, | Performed by: OTOLARYNGOLOGY

## 2022-09-15 PROCEDURE — 99999 PR PBB SHADOW E&M-EST. PATIENT-LVL V: ICD-10-PCS | Mod: PBBFAC,,, | Performed by: OTOLARYNGOLOGY

## 2022-09-15 PROCEDURE — 99999 PR PBB SHADOW E&M-EST. PATIENT-LVL V: CPT | Mod: PBBFAC,,, | Performed by: OTOLARYNGOLOGY

## 2022-09-15 PROCEDURE — 3044F PR MOST RECENT HEMOGLOBIN A1C LEVEL <7.0%: ICD-10-PCS | Mod: CPTII,S$GLB,, | Performed by: OTOLARYNGOLOGY

## 2022-09-15 PROCEDURE — 99999 PR PBB SHADOW E&M-EST. PATIENT-LVL III: ICD-10-PCS | Mod: PBBFAC,,, | Performed by: SURGERY

## 2022-09-15 PROCEDURE — 1160F RVW MEDS BY RX/DR IN RCRD: CPT | Mod: CPTII,S$GLB,, | Performed by: SURGERY

## 2022-09-15 PROCEDURE — 99213 PR OFFICE/OUTPT VISIT, EST, LEVL III, 20-29 MIN: ICD-10-PCS | Mod: S$GLB,,, | Performed by: SURGERY

## 2022-09-15 PROCEDURE — 3079F DIAST BP 80-89 MM HG: CPT | Mod: CPTII,S$GLB,, | Performed by: OTOLARYNGOLOGY

## 2022-09-15 PROCEDURE — 3044F HG A1C LEVEL LT 7.0%: CPT | Mod: CPTII,S$GLB,, | Performed by: OTOLARYNGOLOGY

## 2022-09-15 NOTE — PROGRESS NOTES
SUBJECTIVE:     History of Present Illness:  Very pleasant 51-year-old male referred by Dr. Cedeño for newly diagnosed right upper back malignant melanoma.  Patient reports pigmented lesion had been located in this area for several years.  His wife subsequently noticed that the lesion had increased in size and was also darkening.  Patient denies bleeding or any significant discomfort in this area.  Patient underwent full body skin examination with subsequent punch biopsy performed on August 4th.  Pathology consistent with 0.5 mm superficial spreading malignant melanoma.  No evidence of ulceration.  No dermal mitoses noted.  No lymphovascular invasion.      Patient denies personal or family history of skin malignancy.  He does report excessive sun exposure throughout his youth.    S/p WLE 9/1/2022  Pathology: SKIN AND SUBCUTANEOUS TISSUE, RIGHT UPPER BACK, WIDE EXCISION BIOPSY    SITE:   - RESIDUAL IN SITU MELANOMA IN ASSOCIATION WITH DERMAL NEVUS AT BIOPSY    SITE.   - MARGINS NEGATIVE FOR TUMOR.    9/15/2022  Patient doing well following WLE.  Patient denies any pain.  No drainage.  No numbness or paresthesias    No chief complaint on file.      Review of patient's allergies indicates:   Allergen Reactions    Penicillins Nausea And Vomiting     Other reaction(s): vomiting as child       Current Outpatient Medications   Medication Sig Dispense Refill    armodafiniL 200 mg Tab Take 1 tablet by mouth once daily. 30 tablet 1    cholecalciferol, vitamin D3, (VITAMIN D3) 50 mcg (2,000 unit) Tab Take 1 tablet (2,000 Units total) by mouth once daily.      DULoxetine (CYMBALTA) 30 MG capsule TAKE 1 CAPSULE (30 MG TOTAL) BY MOUTH ONCE DAILY. 30 capsule 11    fluconazole (DIFLUCAN) 200 MG Tab Take 200 mg by mouth every Thursday.      levothyroxine (SYNTHROID) 88 MCG tablet Take 1 tablet (88 mcg total) by mouth before breakfast. 90 tablet 1    ondansetron (ZOFRAN-ODT) 8 MG TbDL Take 1 tablet (8 mg total) by mouth every 6 (six)  hours as needed (nausea). 10 tablet 0    podofilox (CONDYLOX) 0.5 % external solution Apply topically 2 (two) times daily as needed.      CIALIS 5 mg tablet Take 1 tablet (5 mg total) by mouth daily as needed. 90 tablet 3    HYDROcodone-acetaminophen (NORCO) 5-325 mg per tablet Take 1 tablet by mouth every 6 (six) hours as needed for Pain. (Patient not taking: Reported on 9/15/2022) 20 tablet 0     No current facility-administered medications for this visit.       Past Medical History:   Diagnosis Date    Ankle sprain     Anxiety disorder     Arthralgia     Back pain     Circadian rhythm sleep disorder, shift work type     History of chicken pox     History of exposure to hazardous bodily fluids     Hypothyroid     4/06/21 RXd Levothyroxine 88 mcg daily; ordered follow up TSH/T4/T3 for 8/2021    Onychomycosis     Periorbital cellulitis     Sleep apnea     uses C-PAP    Stye      Past Surgical History:   Procedure Laterality Date    EXCISION OF MELANOMA Right 9/1/2022    Procedure: EXCISION, MELANOMA -  upper back - Wide Excision;  Surgeon: Tobin Camacho MD;  Location: [facility];  Service: General;  Laterality: Right;     Family History   Problem Relation Age of Onset    Diabetes Maternal Grandfather     Heart disease Maternal Grandfather     Heart attack Maternal Grandfather     Emphysema Mother     Asthma Mother     Heart attack Maternal Grandmother      Social History     Tobacco Use    Smoking status: Never    Smokeless tobacco: Never   Substance Use Topics    Alcohol use: Yes     Comment: occasionally    Drug use: No        Review of Systems:  Review of Systems   Constitutional:  Negative for appetite change, chills and fever.   Respiratory:  Negative for cough, chest tightness and shortness of breath.    Cardiovascular:  Negative for chest pain and palpitations.   Gastrointestinal:  Negative for abdominal pain.   Hematological:  Negative for adenopathy. Does not bruise/bleed easily.     OBJECTIVE:     Vital  Signs (Most Recent)  Temp: 97.7 °F (36.5 °C) (09/15/22 1410)  Pulse: 96 (09/15/22 1410)  Resp: 18 (09/15/22 1410)  BP: 129/89 (09/15/22 1410)  SpO2: 97 % (09/15/22 1410)  6' (1.829 m)  123.8 kg (272 lb 14.9 oz)     Physical Exam:  Physical Exam  Constitutional:       General: He is not in acute distress.     Comments: Fair complexion     Cardiovascular:      Rate and Rhythm: Normal rate.      Pulses: Normal pulses.   Pulmonary:      Effort: Pulmonary effort is normal. No respiratory distress.      Breath sounds: No wheezing.   Musculoskeletal:      Cervical back: Normal range of motion and neck supple.   Skin:         Neurological:      Mental Status: He is alert.           Diagnostic Results:  none    Assessment:       No diagnosis found.      Plan:       Patient doing well following wide local excision.  Pathology report favorable with evidence of residual melanoma in Situ without any invasive component.  Margins widely clear.  Nylon sutures removed and Steri-Strips applied.  Wound well healed.  Continue to avoid strenuous activity for an additional 1-2 weeks.  Furthermore have discussed the ongoing importance of lifelong dermatology call follow-up.  Patient reports he is scheduled for Q 3 follow-up moving forward with his dermatology team.  He will follow up with me p.r.n.

## 2022-09-15 NOTE — PROGRESS NOTES
Date of Encounter: 9/15/2022  Provider: Christel Carrasquillo MD  Referring MD: MD Davidson  PCP: Arsenio Alarcon II MD  Derm: MD Davidson    CC:  CIS right lip    HPI:      Patient is a 51-year-old male who was referred for evaluation treatment of carcinoma in Situ involving his right lower lip by Dr. Cedeño    Patient and his wife noted a lesion involving his right lower lip that would not heal X 3 months. Lesion was biopsied by Dr Cedeño and since that time, the leison has not retured.  His wife also noted a pigmented lesion on his back.  He is S/P WLE MIS with clear margins by Dr Tobin Zambrano.     Hx of sun exposure during childhood but not as an adult. He has tamara family hx  melanoma    ROS:  Constitutional: Negative for activity change and appetite change, weight loss.   Eyes: Negative for discharge, visual changes.   Respiratory: Negative for difficulty breathing and wheezing   Cardiovascular: Negative for chest pain.   Gastrointestinal: Negative for abdominal distention and abdominal pain.   Endocrine: Negative for cold intolerance and heat intolerance.   Genitourinary: Negative for dysuria.   Musculoskeletal: Negative for gait problem, muscle pain and joint swelling.   Skin: Negative for color change and pallor; negative for skin lesions.   Neurological: Negative for syncope and weakness; no numbness face.   Psychiatric/Behavioral: Negative for agitation and confusion; negative for depression.    Physical Exam:      Constitutional  General Appearance: well nourished, well-developed, alert, oriented, in no acute distress  Communication: ability, understanding, normal  Head and Face  Inspection: normocephalic, atraumatic, no scars, lesions or masses   Palpation: no stepoffs, sinus tenderness or masses  Parotid glands: no masses, stones, swelling or tenderness  Eyes  Ocular Motility / Alignment: normal alignment, motility, no proptosis, enophthalmus or nystagmus  Conjunctiva: not injected  Eyelids: no hooding, lag, entropion, or  ectropion  Ears  Hearing: speech reception thresholds grossly normal  External Ears: no auricle lesions, non-tender, mobile to palpation  Otoscopy:  Right Ear: no tympanic membrane lesions, perforations, or effusion, normal EAC  Left Ear: no tympanic membrane lesions, perforations, or effusion, normal EAC  Oral Cavity / Oropharynx  Lips: upper pink and moist; very subtle change in vermillion right lower lip (biopsy site)--patient pointed to it and photos are in Epic; no induration palpated  Teeth: good dentition  Gingiva: healthy  Oral Mucosa: moist, no mucosal lesions  Floor of Mouth: normal, no lesions, salivary ducts patent  Tongue: moist, normal mobility, no lesions  Palate: soft and hard palates without lesions or ulcers  Neck  Inspection and Palpation: no erythema, induration, emphysema, tenderness or masses  Larynx and Trachea: normal position; normal crepitus  Thyroid: no tenderness, enlargement or nodules  Submandibular Glands: no masses or tenderness  Lymphatic:  Anterior, Posterior, Submandibular, Submental, Supraclavicular: no lymphadenopathy present  Neurological  Cranial Nerves: grossly intact  General: no focal deficits  Psychiatric  Orientation: oriented to time, place and person  Mood and Affect: no depression, anxiety or agitation    Path:   LIP: CIS with tumor involving deep and peripheral margins      MIS upper back  SKIN AND SUBCUTANEOUS TISSUE, RIGHT UPPER BACK, WIDE EXCISION BIOPSY    SITE:   - RESIDUAL IN SITU MELANOMA IN ASSOCIATION WITH DERMAL NEVUS AT BIOPSY    SITE.   - MARGINS NEGATIVE FOR TUMOR.       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    Plan:   Options discussed:  Wide local excision in the area of previous biopsy although there is no distinct lesions; referral to Dr. Nba Gutierrez  for Moh's micro excision and possible treatment with 5 FU  Referral to Dr. Gutierrez would be prudent to avoid resecting a larger amount of lip  the necessary.  Referral sent  Patient will F/U with me if reconstruction is needed

## 2022-09-15 NOTE — PATIENT INSTRUCTIONS
Carcinoma in -situ lip  Treatment options: observation; 5 FU topical treatment; Moh's excision by Dr Nba Gutierrez

## 2022-09-15 NOTE — NURSING
Patient seen in multi d melanoma clinic today by Dr. Camacho. Dr. Camacho's plan is for patient to follow up prn.   No more navigational needs at this time.

## 2022-09-16 ENCOUNTER — TELEPHONE (OUTPATIENT)
Dept: DERMATOLOGY | Facility: CLINIC | Age: 52
End: 2022-09-16
Payer: COMMERCIAL

## 2022-09-19 ENCOUNTER — TELEPHONE (OUTPATIENT)
Dept: DERMATOLOGY | Facility: CLINIC | Age: 52
End: 2022-09-19
Payer: COMMERCIAL

## 2022-09-21 ENCOUNTER — TELEPHONE (OUTPATIENT)
Dept: DERMATOLOGY | Facility: CLINIC | Age: 52
End: 2022-09-21
Payer: COMMERCIAL

## 2022-09-21 NOTE — TELEPHONE ENCOUNTER
Left message for patient to call me back. I also left a message on his wife phone to have her  call me. I gave our direct line to her. [phone].

## 2022-09-22 ENCOUNTER — TELEPHONE (OUTPATIENT)
Dept: DERMATOLOGY | Facility: CLINIC | Age: 52
End: 2022-09-22
Payer: COMMERCIAL

## 2022-10-03 ENCOUNTER — TELEPHONE (OUTPATIENT)
Dept: HEMATOLOGY/ONCOLOGY | Facility: CLINIC | Age: 52
End: 2022-10-03
Payer: COMMERCIAL

## 2022-10-03 NOTE — TELEPHONE ENCOUNTER
Pt referred to Dr Gutierrez from Dr Carrasquillo, pt has not scheduled his appt with Dr Gutierrez. Called pt and pt's wife and LVM with contact information offering assistance making appt.  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   Dina has tried to contact him several times and left messages, but we have not heard back from him.   I will send him a letter asking him to call us to schedule an appt.  Nba

## 2022-10-05 ENCOUNTER — TELEPHONE (OUTPATIENT)
Dept: HEMATOLOGY/ONCOLOGY | Facility: CLINIC | Age: 52
End: 2022-10-05
Payer: COMMERCIAL

## 2022-10-13 ENCOUNTER — TELEPHONE (OUTPATIENT)
Dept: HEMATOLOGY/ONCOLOGY | Facility: CLINIC | Age: 52
End: 2022-10-13
Payer: COMMERCIAL

## 2023-06-06 ENCOUNTER — PATIENT MESSAGE (OUTPATIENT)
Dept: DERMATOLOGY | Facility: CLINIC | Age: 53
End: 2023-06-06
Payer: COMMERCIAL

## 2023-11-09 ENCOUNTER — TELEPHONE (OUTPATIENT)
Dept: DERMATOLOGY | Facility: CLINIC | Age: 53
End: 2023-11-09
Payer: COMMERCIAL

## 2023-11-28 DIAGNOSIS — C44.311 BASAL CELL CARCINOMA (BCC) OF LEFT SIDE OF NOSE: Primary | ICD-10-CM

## 2023-12-15 ENCOUNTER — TELEPHONE (OUTPATIENT)
Dept: DERMATOLOGY | Facility: CLINIC | Age: 53
End: 2023-12-15
Payer: COMMERCIAL

## 2024-01-30 ENCOUNTER — TELEPHONE (OUTPATIENT)
Dept: DERMATOLOGY | Facility: CLINIC | Age: 54
End: 2024-01-30
Payer: COMMERCIAL

## 2024-01-31 ENCOUNTER — PROCEDURE VISIT (OUTPATIENT)
Dept: DERMATOLOGY | Facility: CLINIC | Age: 54
End: 2024-01-31
Payer: COMMERCIAL

## 2024-01-31 VITALS
SYSTOLIC BLOOD PRESSURE: 122 MMHG | BODY MASS INDEX: 36.44 KG/M2 | DIASTOLIC BLOOD PRESSURE: 75 MMHG | HEART RATE: 57 BPM | HEIGHT: 72 IN | WEIGHT: 269 LBS

## 2024-01-31 DIAGNOSIS — C44.311 BASAL CELL CARCINOMA (BCC) OF LEFT SIDE OF NOSE: ICD-10-CM

## 2024-01-31 PROCEDURE — 15260 FTH/GFT FR N/E/E/L 20 SQCM/<: CPT | Mod: S$GLB,,, | Performed by: DERMATOLOGY

## 2024-01-31 PROCEDURE — 14061 TIS TRNFR E/N/E/L10.1-30SQCM: CPT | Mod: 51,S$GLB,, | Performed by: DERMATOLOGY

## 2024-01-31 PROCEDURE — 17311 MOHS 1 STAGE H/N/HF/G: CPT | Mod: 51,S$GLB,, | Performed by: DERMATOLOGY

## 2024-01-31 PROCEDURE — 17312 MOHS ADDL STAGE: CPT | Mod: S$GLB,,, | Performed by: DERMATOLOGY

## 2024-01-31 RX ORDER — HYDROCODONE BITARTRATE AND ACETAMINOPHEN 5; 325 MG/1; MG/1
1 TABLET ORAL EVERY 6 HOURS PRN
Qty: 3 TABLET | Refills: 0 | Status: SHIPPED | OUTPATIENT
Start: 2024-01-31

## 2024-01-31 RX ORDER — DOXYCYCLINE 100 MG/1
100 CAPSULE ORAL 2 TIMES DAILY
Qty: 14 CAPSULE | Refills: 0 | Status: SHIPPED | OUTPATIENT
Start: 2024-01-31 | End: 2024-02-07

## 2024-01-31 NOTE — PROGRESS NOTES
MOHS MICROGRAPHIC SURGERY OPERATIVE NOTE  Name:  Arsenio Espinoza III  Date: 2024  Patient : 1970  Attending Surgeon: Carolyn Hall MD  Assistants: Faiza Rosas - Surgical Technician  Anesthetic Agent: 1% Lidocaine with 1:200,000 epinephrine  Clinical Diagnosis: basal cell carcinoma - nodular  Operation: Mohs Micrographic Surgery  Location: left nasal ala  Indications: Location in mask areas of face including central face, nose, eyelids, eyebrows, lips, chin, preauricular, temple, and ear.  Surgical Preparation: povidone-iodine     Description of Operation:  The nature and purpose of the procedure, associated risks and alternative treatments were explained to the patient in detail. All patient questions were answered completely. An informed operative consent and photography permit were obtained. The tumor location was then identified and marked with agreement by the patient of the correct location. The patient was positioned, prepped, and draped in the usual sterile manner. Local anesthesia was obtained with 3 cc(s) of 1% Lidocaine with 1:200,000 epinephrine. The lesion pre-operatively measures 0.5 by 0.4 cm.     1ST STAGE:  A 2+ mm rim of normal appearing skin was marked circumferentially around the lesion after scraping with a curette to define the margin. The area thus outlined was excised at a 45 degree angle. Hemostasis was obtained with electrodesiccation. The specimen was oriented, mapped, and subdivided into at least two sections. Sections were then chromacoded and submitted for horizontal frozen sections. The patient tolerated the procedure well and there were no complications. Upon microscopic examination of the processed horizontal frozen sections of this stage:  Tumor was present at the margin of the specimen; these areas of residual tumor were marked on the reference map with red pencil, pinpointing the location in which further tissue excision was necessary.  Tumor type noted  on stage 1: Nodular basal cell carcinoma: Nodular tumor in dermis composed of basaloid cells exhibiting peripheral palisading and retraction artifact.     SUBSEQUENT STAGES:  The patient returned to the operating room for additional stages of tumor removal, and prepped in the manner described above. Surgery was directed to the areas having residual tumor, with thin layers of tissue being excised from these regions. A new reference map was prepared during the surgery to maintain precise orientation as described above. Hemostasis was achieved and the excised tissue was processed for microscopic analysis.  These sections were then examined by the Mohs surgeon, and areas of persistent tumor were indicated on the reference map. This process was repeated until the frozen horizontal sections of STAGE 5 revealed no further tumor cells and tumor eradication was considered to be  complete.  Tumor type noted on subsequent stages: Superficial basal cell carcinoma: Foci of basaloid cells with peripheral palisading and focal retraction artifact arising along the dermoepidermal junction and extending into the papillary dermis.     SUMMARY:  The tumor was extirpated in 5 Mohs Stages resulting in a final defect measuring 1.2 by 0.8 cm².   The final defect extended deep to SMAS  The patient tolerated the procedure well and no complications were noted.     PHOTOS:        Carolyn Hall MD    FLAP CLOSURE OF MOHS DEFECT OPERATIVE REPORT   FULL THICKNESS SKIN GRAFT OF RESIDUAL DEFECT  Patient Name: Arsenio Espinoza III  Patient YOB: 1970  Date of procedure: 1/31/2024  Attending Surgeon: Carolyn Hall MD FAAD  Anesthetic Agent: 1% Lidocaine with 1:200,000 epinephrine   Assistant: Faiza Rosas - Surgical Technician  Clinical Diagnosis: A 3.7 x 2.8 cm² defect after Mohs Micrographic Surgery  Location: left nasal sidewall/ala  Operation:  Advancement flap             Full thickness skin graft of residual  defect (Burow's graft)  Surgical Preparation: povidone-iodine    Description of Operation:  The nature and purpose of the procedure, associated risks and alternative treatments were explained to the patient in detail. All patient questions were answered completely. In order to minimize tension on the closure and avoid compromising the anatomic contour of the anatomic region and maximize functional capacity, the above noted adjacent tissue transfer was performed.    An informed operative consent and photography permit were obtained. The patient was positioned, prepped, and draped in the usual sterile manner. Local anesthesia was obtained with 4 cc(s) of 1% Lidocaine with 1:200,000 epinephrine The defect edges were debeveled with a #15 blade. Using gentian violet, the flap was designed adjacent to the defect including all anticipated dog ears. The area thus outlined was incised deep to SMAS with the scalpel. This resulted in a final undermined and elevated flap defect measuring 3.7 x 2.8 cm².   The flap and skin margins were then undermined 50 to 75% of the defects diameter in all directions utilizing supercut iris scissors. Hemostasis was achieved with electrodessication. The secondary defect was closed first utilizing 4-0 Vicryl interrupted buried subcutaneous vertical mattress sutures.     The adjacent tissue flap was then mobilized into place and anchored with additional 4-0 Vicryl interrupted buried subcutaneous vertical mattress sutures. The flap and recipient sites were sculpted in the adipose and dermal planes for a good fit. The skin edges were then reapposed with 5-0 Nylon. Redundant tissue was noted at the inferior and superior aspect of the adjacent tissue transfer. Burows triangles were removed utilizing a #15 blade and the epidermal edges reapposed with 5-0 Nylon. This repair resulted in excellent contour with normal symmetry for the nasasl sidewal, however, a residual defect was present over the left  nasal ala. This residual defect measured 0.8 x 0.4. A burow's triangle was defatted and sculpted to fit the residual defect. This burow's graft was sutured into place with additional  5-0 Nylon sutures. A bolster of xeroform gauze was then sutured into placed over the graft. The patient tolerated the procedure well and there were no complications.   Polysporin, non-adherent gauze and a pressure dressing were applied to wound after gentle cleansing with saline.    Post op meds: Doxycycline 100 BID x 7 days  and Norco 5-325mg Tab #3. Take 1 take PO Q6H PRN Post-op pain    Photos:      MD NISSA Brewer

## 2024-01-31 NOTE — LETTER
January 31, 2024      Carey Cedeño MD  [address] [facility]  [facility] [address]     [facility] - Dermatology Surgery  [address] [facility] [place] [address]  Phone: [phone]  Fax: [phone]   Patient: Arsenio Espinoza III   MR Number: [id number]   YOB: 1970   Date of Visit: 1/31/2024   Dear Dr. Cedeño:    Arsenio Espinoza III was seen at our office today for Mohs Micrographic surgery on the left nasal ala. The tumor was extirpated in 5 stages leaving a final defect of 1.2 x 0.8 cm. After discussion of the possible repair options and in order to achieve an excellent cosmetic and functional result, the wound was repaired with a(n) advancement flap for the sidewall and a FTSG of the nasal ala.         The patient will return to our clinic in 7 days for suture removal/wound check.   They will then be returned fully to your care pending any further need for our services. Thank you for allowing us to participate in the care of this patient.      Carolyn Hall MD FAAD

## 2024-02-07 ENCOUNTER — CLINICAL SUPPORT (OUTPATIENT)
Dept: DERMATOLOGY | Facility: CLINIC | Age: 54
End: 2024-02-07
Payer: COMMERCIAL

## 2024-02-07 DIAGNOSIS — Z48.02 VISIT FOR SUTURE REMOVAL: Primary | ICD-10-CM

## 2024-02-07 PROCEDURE — 99024 POSTOP FOLLOW-UP VISIT: CPT | Mod: S$GLB,,, | Performed by: DERMATOLOGY

## 2024-02-07 PROCEDURE — 99999 PR PBB SHADOW E&M-EST. PATIENT-LVL I: CPT | Mod: PBBFAC,,,

## 2024-02-07 NOTE — PROGRESS NOTES
Mohs Surgery Suture Removal Note   Patient Name: Arsenio Espinoza III  Patient : 1970  Date of Service: 2024    CC: Pt. Presents for removal of sutures on the nasal ala today  Subjective: patient reports wound healing as expected     Objective: Wound well healed, sutures clean/dry/intact. No evidence of any complications noted.     Visit For Suture Removal:  - Suture removal today  - Steri strips/mastisol were not applied  - Patient counseled on silicone gel/silicone sheeting and their use in the management of post-operative scars  - Handout on silicone gel/silicone gel sheeting was provided  - Patient to follow up with their referring provider in 3 months for further skin evaluation    Amada Chavez

## 2024-04-22 DIAGNOSIS — C44.02 SQUAMOUS CELL CARCINOMA, LIP: Primary | ICD-10-CM

## 2024-04-26 ENCOUNTER — TELEPHONE (OUTPATIENT)
Dept: DERMATOLOGY | Facility: CLINIC | Age: 54
End: 2024-04-26
Payer: COMMERCIAL

## 2024-04-26 NOTE — TELEPHONE ENCOUNTER
Tried to call patient to confirm mohs procedure. Pt did not answer, left vm explaining why I was calling. Will try to call pt again either tomorrow or later in the day

## 2024-05-14 ENCOUNTER — PROCEDURE VISIT (OUTPATIENT)
Dept: DERMATOLOGY | Facility: CLINIC | Age: 54
End: 2024-05-14
Payer: COMMERCIAL

## 2024-05-14 VITALS
WEIGHT: 268.94 LBS | BODY MASS INDEX: 36.43 KG/M2 | HEART RATE: 71 BPM | DIASTOLIC BLOOD PRESSURE: 75 MMHG | HEIGHT: 72 IN | SYSTOLIC BLOOD PRESSURE: 117 MMHG

## 2024-05-14 DIAGNOSIS — C44.02 SQUAMOUS CELL CARCINOMA, LIP: ICD-10-CM

## 2024-05-14 DIAGNOSIS — D04.0 SQUAMOUS CELL CARCINOMA IN SITU OF SKIN OF LIP: Primary | ICD-10-CM

## 2024-05-14 PROCEDURE — 13152 CMPLX RPR E/N/E/L 2.6-7.5 CM: CPT | Mod: 51,S$GLB,, | Performed by: DERMATOLOGY

## 2024-05-14 PROCEDURE — 17312 MOHS ADDL STAGE: CPT | Mod: S$GLB,,, | Performed by: DERMATOLOGY

## 2024-05-14 PROCEDURE — 17311 MOHS 1 STAGE H/N/HF/G: CPT | Mod: S$GLB,,, | Performed by: DERMATOLOGY

## 2024-05-14 NOTE — PROGRESS NOTES
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  Surgical Preparation: povidone-iodine     Description of Operation:  The nature and purpose of the procedure, associated risks and alternative treatments were explained to the patient in detail. All patient questions were answered completely. An informed operative consent and photography permit were obtained. The tumor location was then identified and marked with agreement by the patient of the correct location. The patient was positioned, prepped, and draped in the usual sterile manner. Local anesthesia was obtained with 1 cc(s) of 1% lidocaine with 1:100,000 epinephrine. The lesion pre-operatively measures 0.4 by 0.4 cm.     1ST STAGE:  A 2+ mm rim of normal appearing skin was marked circumferentially around the lesion after scraping with a curette to define the margin. The area thus outlined was excised at a 45 degree angle. Hemostasis was obtained with electrodesiccation. The specimen was oriented, mapped, and subdivided into at least two sections. Sections were then chromacoded and submitted for horizontal frozen sections. The patient tolerated the procedure well and there were no complications. Upon microscopic examination of the processed horizontal frozen sections of this stage:  Tumor was present at the margin of the specimen; these areas of residual tumor were marked on the reference map with red pencil, pinpointing the location in which further tissue excision was  necessary.  Tumor type noted on stage 1: Squamous cell carcinoma in situ: Epidermis with full-thickness atypia and variable epidermal maturation.     SUBSEQUENT STAGES:  The patient returned to the operating room for additional stages of tumor removal, and prepped in the manner described above. Surgery was directed to the areas having residual tumor, with thin layers of tissue being excised from these regions. A new reference map was prepared during the surgery to maintain precise orientation as described above. Hemostasis was achieved and the excised tissue was processed for microscopic analysis.  These sections were then examined by the Mohs surgeon, and areas of persistent tumor were indicated on the reference map. This process was repeated until the frozen horizontal sections of STAGE 3 revealed no further tumor cells and tumor eradication was considered to be  complete.  Tumor type noted on subsequent stages: Squamous cell carcinoma in situ: Epidermis with full-thickness atypia and variable epidermal maturation.     SUMMARY:  The tumor was extirpated in 3 Mohs Stages resulting in a final defect measuring 2.1 by 1.1 cm².   The final defect extended deep to subcutaneous fat  The patient tolerated the procedure well and no complications were noted.     PHOTOS:        Carolyn Hall MD  Complex Linear Closure Operative Note  Name: Arsenio Espinoza III  YOB: 1970  Date: 5/14/2024  Attending Surgeon: Carolyn Hall MD FAAD  Assistants:  Faiza Rosas - Surgical Technician  Clinical Diagnosis: A 2.1 by 1.1 cm defect secondary to Mohs Micrographic Surgery  Location: right inferior vermilion lip  Operation: Complex linear closure  Surgical Preparation: broad scrub with povidone-iodine    Description of Operation:  The nature and purpose of the procedure, associated risks and alternative treatments were explained to the patient in detail. All patient questions were answered completely. In  order to minimize tension on the closure and avoid compromising the anatomic contour of the cosmetic region and maximize functional capacity, a complex linear closure was performed. An informed operative consent and photography permit were obtained. The patient was positioned, prepped, and draped in the usual sterile manner. Local anesthesia was obtained with 3 cc(s) of 1% lidocaine with 1:100,000 epinephrine.    The defect edges were debeveled with a scalpel blade. The circular defect was widely undermined in the deep subcutaneous plane at least 100% of the defect diameter to allow for maximum tissue movement. Hemostasis was achieved with spot electrodessication. The defect was closed first utilizing multiple 4-0 Vicryl interrupted buried subcutaneous sutures. This resulted in excellent apposition of the dermis and epidermis, however two redundant areas of tissue were created on opposite sides of the defect. Utilizing a #15 scalpel blade, two Burows triangles were excised to ensure a flat scar. Hemostasis was obtained with spot electrodessication. The skin edges throughout further fastened superficially with 5-0 Fast Absorbing Gut. The final length of the repair was 4.8 cm. The patient tolerated the procedure well and there were no complications.    Polysporin, non-adherent gauze and a pressure dressing were applied to wound after gentle cleansing with saline.    Patient instructed in and provided with instructions for post-op care, will RTC in as needed after he returns from vacation    Post op meds: None    Photos:

## 2024-05-14 NOTE — LETTER
May 14, 2024        Carey Cedeño MD  [address] [facility]  [facility] [address]       [facility]  [address] [facility] [address]  Phone: [phone]  Fax: [phone]   Patient: Arsenio Espinoza III   MR Number: [id number]   YOB: 1970   Date of Visit: 5/14/2024     Dear Dr. Cedeño:     Arsenio Espinoza III was seen at our office today for Mohs Micrographic surgery on the right inferior vermilion lip. The tumor was extirpated in 3 stages leaving a final defect of 2.1 x 1.1 cm. After discussion of the possible repair options and in order to achieve an excellent cosmetic and functional result, the wound was repaired with a(n) complex linear closure.                  The patient will return to our clinic in 7 days for suture removal/wound check.   They will then be returned fully to your care pending any further need for our services. Thank you for allowing us to participate in the care of this patient.      Carolyn Hall MD FAAD